# Patient Record
Sex: FEMALE | Race: ASIAN | NOT HISPANIC OR LATINO | URBAN - METROPOLITAN AREA
[De-identification: names, ages, dates, MRNs, and addresses within clinical notes are randomized per-mention and may not be internally consistent; named-entity substitution may affect disease eponyms.]

---

## 2020-01-23 ENCOUNTER — AMBULATORY - RIVER FALLS (OUTPATIENT)
Dept: FAMILY MEDICINE | Facility: CLINIC | Age: 25
End: 2020-01-23

## 2020-02-17 ENCOUNTER — OFFICE VISIT - RIVER FALLS (OUTPATIENT)
Dept: FAMILY MEDICINE | Facility: CLINIC | Age: 25
End: 2020-02-17

## 2020-02-17 ASSESSMENT — MIFFLIN-ST. JEOR: SCORE: 1455.09

## 2022-02-12 VITALS
DIASTOLIC BLOOD PRESSURE: 92 MMHG | HEART RATE: 96 BPM | TEMPERATURE: 98.8 F | WEIGHT: 157 LBS | HEIGHT: 65 IN | BODY MASS INDEX: 26.16 KG/M2 | SYSTOLIC BLOOD PRESSURE: 130 MMHG

## 2022-02-15 NOTE — NURSING NOTE
Comprehensive Intake Entered On:  2/17/2020 9:34 AM CST    Performed On:  2/17/2020 9:27 AM CST by Kathie Long               Summary   Chief Complaint :   Patient here today for twisted  left ankle. Patient fell and twisted ankle- patient did hear a popping sound. Left ankle is swollen    Weight Measured :   157 lb(Converted to: 157 lb 0 oz, 71.21 kg)    Height Measured :   64.5 in(Converted to: 5 ft 4 in, 163.83 cm)    Body Mass Index :   26.53 kg/m2 (HI)    Body Surface Area :   1.8 m2   Systolic Blood Pressure :   130 mmHg   Diastolic Blood Pressure :   92 mmHg (HI)    Mean Arterial Pressure :   105 mmHg   Peripheral Pulse Rate :   96 bpm   BP Site :   Right arm   BP Method :   Manual   HR Method :   Electronic   Temperature Tympanic :   98.8 DegF(Converted to: 37.1 DegC)    Kathie Long - 2/17/2020 9:27 AM CST   Health Status   Allergies Verified? :   Yes   Medication History Verified? :   Yes   Medical History Verified? :   Yes   Pre-Visit Planning Status :   Completed   Tobacco Use? :   Never smoker   Kathie Long - 2/17/2020 9:27 AM CST   Consents   Consent for Immunization Exchange :   Consent Granted   Consent for Immunizations to Providers :   Consent Granted   Kathie Long - 2/17/2020 9:27 AM CST   Meds / Allergies   (As Of: 2/17/2020 9:34:03 AM CST)   Allergies (Active)   No Known Medication Allergies  Estimated Onset Date:   Unspecified ; Created By:   Kathie Long; Reaction Status:   Active ; Category:   Drug ; Substance:   No Known Medication Allergies ; Type:   Allergy ; Updated By:   Kathie Long; Reviewed Date:   2/17/2020 9:31 AM CST        Medication List   (As Of: 2/17/2020 9:34:03 AM CST)   No Known Home Medications     Kathie Long - 2/17/2020 9:32:00 AM      [facility] Med[facility]    influenza virus vaccine, inactivated  :   influenza virus vaccine, inactivated ; Status:   Ordered ; Ordered As Mnemonic:   Fluzone Quadrivalent [id number] ; Simple Display Line:    0.5 mL, IM, once ; Ordering Provider:   Jorge Luis Del Rio MD; Catalog Code:   influenza virus vaccine, inactivated ; Order Dt/Tm:   1/23/2020 2:40:28 PM CST ; Comment:   Ages 3+

## 2022-02-15 NOTE — PROGRESS NOTES
Patient:   BEAN MUHAMMAD            MRN: [id number]            FIN: [id number]               Age:   24 years     Sex:  Female     :  1995   Associated Diagnoses:   Left ankle sprain   Author:   Alexandr Carter PA-C      Chief Complaint   2020 9:27 AM CST    Patient here today for twisted  left ankle. Patient fell and twisted ankle- patient did hear a popping sound. Left ankle is swollen      History of Present Illness   Chief complaint and symptoms noted above and confirmed with patient   injured her left ankle last night playing [place], ankle is swollen, painful to walk on   has iced it but has not used tylenol or ibuprofen      Review of Systems   Constitutional:  Negative.    Musculoskeletal:  Joint redness, Joint stiffness, Joint swelling.       Health Status   Allergies:    Allergic Reactions (Selected)  No Known Medication Allergies   Medications:  (Selected)   Outpatient Medications  Ordered  Fluzone Quadrivalent [id number]: 0.5 mL, IM, once      Histories   Past Medical History:    No active or resolved past medical history items have been selected or recorded.   Family History:    No family history items have been selected or recorded.   Procedure history:    No active procedure history items have been selected or recorded.      Physical Examination   Vital Signs   2020 9:27 AM CST Temperature Tympanic 98.8 DegF    Peripheral Pulse Rate 96 bpm    HR Method Electronic    Systolic Blood Pressure 130 mmHg    Diastolic Blood Pressure 92 mmHg  HI    Mean Arterial Pressure 105 mmHg    BP Site Right arm    BP Method Manual      Measurements from flowsheet : Measurements   2020 9:27 AM CST Height Measured - Standard 64.5 in    Weight Measured - Standard 157 lb    BSA 1.8 m2    Body Mass Index 26.53 kg/m2  HI      General:  No acute distress.    Musculoskeletal:  Milld  swelling but no deformation of left ankle.  Good active ROM.  Good posterior tibial and dorsal pedal pulses.  There is  tenderness over the lateral maleolus but not over the medial maleolus or over the base of the 5th metatarsal.  .       Review / Management   Radiology results   X-ray, Results reviewed with patient.  Xray shows no fractures or dislocations per my read.  Will be over-read by radiologist.  Will call if over-read has additional information.      Impression and Plan   Diagnosis     Left ankle sprain ([id number] S93.402A).     Plan:  Use aircast for 2 weeks.  Rest, ice, NSAIDs.  Range of motion exercises then strengthening exercises.  Follow up if not improving..    Patient Instructions:   encouraged ROM exercises,  make sure ankle is fully healed before returning to sports.    Orders     Orders   Requests (Radiology):  XR Ankle AP/Lat/Mort Left (Request) (Order): Left ankle sprain.     Orders   Charges (Evaluation and Management):  [id number] office outpatient new 30 minutes (Charge) (Order): Quantity: 1, Left ankle sprain.